# Patient Record
Sex: MALE | Race: WHITE | NOT HISPANIC OR LATINO | Employment: FULL TIME | URBAN - NONMETROPOLITAN AREA
[De-identification: names, ages, dates, MRNs, and addresses within clinical notes are randomized per-mention and may not be internally consistent; named-entity substitution may affect disease eponyms.]

---

## 2023-03-31 LAB
ANION GAP IN SER/PLAS: 15 MMOL/L (ref 10–20)
CALCIUM (MG/DL) IN SER/PLAS: 9.8 MG/DL (ref 8.6–10.6)
CARBON DIOXIDE, TOTAL (MMOL/L) IN SER/PLAS: 23 MMOL/L (ref 21–32)
CHLORIDE (MMOL/L) IN SER/PLAS: 106 MMOL/L (ref 98–107)
CREATININE (MG/DL) IN SER/PLAS: 1.27 MG/DL (ref 0.5–1.3)
ESTIMATED AVERAGE GLUCOSE FOR HBA1C: 154 MG/DL
GFR MALE: 65 ML/MIN/1.73M2
GLUCOSE (MG/DL) IN SER/PLAS: 115 MG/DL (ref 74–99)
HEMOGLOBIN A1C/HEMOGLOBIN TOTAL IN BLOOD: 7 %
POTASSIUM (MMOL/L) IN SER/PLAS: 4.2 MMOL/L (ref 3.5–5.3)
SODIUM (MMOL/L) IN SER/PLAS: 140 MMOL/L (ref 136–145)
UREA NITROGEN (MG/DL) IN SER/PLAS: 19 MG/DL (ref 6–23)

## 2023-04-21 LAB
ALANINE AMINOTRANSFERASE (SGPT) (U/L) IN SER/PLAS: 22 U/L (ref 10–52)
ALBUMIN (G/DL) IN SER/PLAS: 4.6 G/DL (ref 3.4–5)
ALBUMIN (MG/L) IN URINE: 7.7 MG/L
ALBUMIN/CREATININE (UG/MG) IN URINE: 5.1 UG/MG CRT (ref 0–30)
ALKALINE PHOSPHATASE (U/L) IN SER/PLAS: 55 U/L (ref 33–120)
ANION GAP IN SER/PLAS: 13 MMOL/L (ref 10–20)
ASPARTATE AMINOTRANSFERASE (SGOT) (U/L) IN SER/PLAS: 21 U/L (ref 9–39)
BILIRUBIN TOTAL (MG/DL) IN SER/PLAS: 0.5 MG/DL (ref 0–1.2)
CALCIUM (MG/DL) IN SER/PLAS: 10.6 MG/DL (ref 8.6–10.6)
CARBON DIOXIDE, TOTAL (MMOL/L) IN SER/PLAS: 25 MMOL/L (ref 21–32)
CHLORIDE (MMOL/L) IN SER/PLAS: 106 MMOL/L (ref 98–107)
CHOLESTEROL (MG/DL) IN SER/PLAS: 164 MG/DL (ref 0–199)
CHOLESTEROL IN HDL (MG/DL) IN SER/PLAS: 39.1 MG/DL
CHOLESTEROL/HDL RATIO: 4.2
CREATININE (MG/DL) IN SER/PLAS: 1.36 MG/DL (ref 0.5–1.3)
CREATININE (MG/DL) IN URINE: 152 MG/DL (ref 20–370)
ESTIMATED AVERAGE GLUCOSE FOR HBA1C: 151 MG/DL
GFR MALE: 60 ML/MIN/1.73M2
GLUCOSE (MG/DL) IN SER/PLAS: 117 MG/DL (ref 74–99)
HEMOGLOBIN A1C/HEMOGLOBIN TOTAL IN BLOOD: 6.9 %
LDL: 101 MG/DL (ref 0–99)
POTASSIUM (MMOL/L) IN SER/PLAS: 4.1 MMOL/L (ref 3.5–5.3)
PROSTATE SPECIFIC ANTIGEN,SCREEN: 1.01 NG/ML (ref 0–4)
PROTEIN TOTAL: 7.4 G/DL (ref 6.4–8.2)
SODIUM (MMOL/L) IN SER/PLAS: 140 MMOL/L (ref 136–145)
THYROTROPIN (MIU/L) IN SER/PLAS BY DETECTION LIMIT <= 0.05 MIU/L: 3.79 MIU/L (ref 0.44–3.98)
TRIGLYCERIDE (MG/DL) IN SER/PLAS: 119 MG/DL (ref 0–149)
UREA NITROGEN (MG/DL) IN SER/PLAS: 18 MG/DL (ref 6–23)
VLDL: 24 MG/DL (ref 0–40)

## 2023-04-23 NOTE — RESULT ENCOUNTER NOTE
Labs/urine results are all stable, reassuring.  A1c is 6.9.  Keep follow up here and with endocrinology per usual

## 2023-06-01 PROBLEM — E78.1 HYPERTRIGLYCERIDEMIA: Status: ACTIVE | Noted: 2023-06-01

## 2023-06-01 PROBLEM — R56.9 SEIZURE (MULTI): Status: ACTIVE | Noted: 2023-06-01

## 2023-06-01 PROBLEM — E16.2 HYPOGLYCEMIA: Status: ACTIVE | Noted: 2023-06-01

## 2023-06-01 PROBLEM — N52.1: Status: ACTIVE | Noted: 2023-06-01

## 2023-06-01 PROBLEM — N52.9 ERECTILE DYSFUNCTION: Status: ACTIVE | Noted: 2023-06-01

## 2023-06-01 PROBLEM — K42.9 UMBILICAL HERNIA: Status: ACTIVE | Noted: 2023-06-01

## 2023-06-01 PROBLEM — M62.08 RECTUS DIASTASIS: Status: ACTIVE | Noted: 2023-06-01

## 2023-06-01 PROBLEM — R94.31 ABNORMAL EKG: Status: ACTIVE | Noted: 2023-06-01

## 2023-06-01 PROBLEM — E78.5 HYPERLIPIDEMIA: Status: ACTIVE | Noted: 2023-06-01

## 2023-06-01 PROBLEM — I10 HYPERTENSION: Status: ACTIVE | Noted: 2023-06-01

## 2023-06-01 PROBLEM — E55.9 MILD VITAMIN D DEFICIENCY: Status: ACTIVE | Noted: 2023-06-01

## 2023-06-01 PROBLEM — R60.0 EDEMA OF BOTH LOWER EXTREMITIES: Status: ACTIVE | Noted: 2023-06-01

## 2023-06-01 PROBLEM — R07.89 CHEST PAIN, ATYPICAL: Status: ACTIVE | Noted: 2023-06-01

## 2023-06-01 PROBLEM — E10.69: Status: ACTIVE | Noted: 2023-06-01

## 2023-06-01 PROBLEM — R79.89 ELEVATED SERUM CREATININE: Status: ACTIVE | Noted: 2023-06-01

## 2023-06-01 PROBLEM — M79.643 PAIN, HAND: Status: ACTIVE | Noted: 2023-06-01

## 2023-06-01 RX ORDER — INSULIN ASPART 100 [IU]/ML
INJECTION, SOLUTION INTRAVENOUS; SUBCUTANEOUS
COMMUNITY
Start: 2014-01-08

## 2023-06-01 RX ORDER — TADALAFIL 20 MG/1
TABLET ORAL
COMMUNITY
Start: 2019-01-07 | End: 2023-09-08

## 2023-06-01 RX ORDER — ASPIRIN 81 MG/1
TABLET ORAL
COMMUNITY

## 2023-06-01 RX ORDER — TRIAMCINOLONE ACETONIDE 1 MG/G
CREAM TOPICAL
COMMUNITY
Start: 2020-08-14 | End: 2023-12-07 | Stop reason: WASHOUT

## 2023-06-01 RX ORDER — ALBUTEROL SULFATE 90 UG/1
AEROSOL, METERED RESPIRATORY (INHALATION)
COMMUNITY
Start: 2018-03-14

## 2023-06-01 RX ORDER — CARVEDILOL PHOSPHATE 10 MG/1
CAPSULE, EXTENDED RELEASE ORAL
COMMUNITY
Start: 2022-05-18 | End: 2023-08-19

## 2023-06-01 RX ORDER — ACETAMINOPHEN 500 MG
TABLET ORAL
COMMUNITY

## 2023-06-01 RX ORDER — HYDROCHLOROTHIAZIDE 12.5 MG/1
1 TABLET ORAL DAILY
COMMUNITY
Start: 2022-03-25 | End: 2023-12-14

## 2023-06-01 RX ORDER — SIMVASTATIN 40 MG/1
TABLET, FILM COATED ORAL
COMMUNITY
Start: 2014-09-04 | End: 2023-11-21

## 2023-06-01 RX ORDER — LISINOPRIL 40 MG/1
40 TABLET ORAL
COMMUNITY
End: 2023-12-14

## 2023-06-01 RX ORDER — FENOFIBRATE 145 MG/1
1 TABLET, FILM COATED ORAL DAILY
COMMUNITY
Start: 2021-08-20 | End: 2023-08-07

## 2023-06-01 RX ORDER — BLOOD SUGAR DIAGNOSTIC
STRIP MISCELLANEOUS
COMMUNITY
Start: 2014-03-28

## 2023-06-02 ENCOUNTER — OFFICE VISIT (OUTPATIENT)
Dept: PRIMARY CARE | Facility: CLINIC | Age: 58
End: 2023-06-02
Payer: COMMERCIAL

## 2023-06-02 VITALS
SYSTOLIC BLOOD PRESSURE: 106 MMHG | WEIGHT: 261.1 LBS | TEMPERATURE: 98.3 F | DIASTOLIC BLOOD PRESSURE: 64 MMHG | BODY MASS INDEX: 35.91 KG/M2 | RESPIRATION RATE: 14 BRPM | OXYGEN SATURATION: 93 % | HEART RATE: 76 BPM

## 2023-06-02 DIAGNOSIS — N52.1: ICD-10-CM

## 2023-06-02 DIAGNOSIS — Z23 IMMUNIZATION DUE: Primary | ICD-10-CM

## 2023-06-02 DIAGNOSIS — E10.69: ICD-10-CM

## 2023-06-02 DIAGNOSIS — I10 PRIMARY HYPERTENSION: ICD-10-CM

## 2023-06-02 DIAGNOSIS — E11.3293: ICD-10-CM

## 2023-06-02 PROBLEM — E66.812 OBESITY, CLASS II, BMI 35-39.9: Status: ACTIVE | Noted: 2021-03-08

## 2023-06-02 PROBLEM — F90.0 ATTENTION DEFICIT HYPERACTIVITY DISORDER, PREDOMINANTLY INATTENTIVE TYPE: Status: ACTIVE | Noted: 2019-04-03

## 2023-06-02 PROBLEM — E66.9 OBESITY, CLASS II, BMI 35-39.9: Status: ACTIVE | Noted: 2021-03-08

## 2023-06-02 PROCEDURE — 90714 TD VACC NO PRESV 7 YRS+ IM: CPT | Performed by: FAMILY MEDICINE

## 2023-06-02 PROCEDURE — 99214 OFFICE O/P EST MOD 30 MIN: CPT | Performed by: FAMILY MEDICINE

## 2023-06-02 PROCEDURE — 3078F DIAST BP <80 MM HG: CPT | Performed by: FAMILY MEDICINE

## 2023-06-02 PROCEDURE — 3044F HG A1C LEVEL LT 7.0%: CPT | Performed by: FAMILY MEDICINE

## 2023-06-02 PROCEDURE — 90471 IMMUNIZATION ADMIN: CPT | Performed by: FAMILY MEDICINE

## 2023-06-02 PROCEDURE — 4010F ACE/ARB THERAPY RXD/TAKEN: CPT | Performed by: FAMILY MEDICINE

## 2023-06-02 PROCEDURE — 1036F TOBACCO NON-USER: CPT | Performed by: FAMILY MEDICINE

## 2023-06-02 PROCEDURE — 3074F SYST BP LT 130 MM HG: CPT | Performed by: FAMILY MEDICINE

## 2023-06-02 ASSESSMENT — PATIENT HEALTH QUESTIONNAIRE - PHQ9
1. LITTLE INTEREST OR PLEASURE IN DOING THINGS: NOT AT ALL
2. FEELING DOWN, DEPRESSED OR HOPELESS: NOT AT ALL
SUM OF ALL RESPONSES TO PHQ9 QUESTIONS 1 AND 2: 0

## 2023-06-02 ASSESSMENT — PAIN SCALES - GENERAL: PAINLEVEL: 0-NO PAIN

## 2023-06-02 NOTE — PROGRESS NOTES
Subjective   Patient ID: Thompson Dhillon is a 57 y.o. male who presents for Hypertension, Hyperlipidemia, and Diabetes.    HPI   Thompson was seen today for routine follow-up of his hypertension, hyperlipidemia.  Diabetes is managed by endocrinology.  Medication(s) are being taken and tolerated as prescribed, without concerns, list reconciled today.  There are no complaints of chest pain, shortness of breath, lower extremity edema, or exertional concerns.    His last A1c was in the upper 6 range.  He denies frequent hypoglycemia, has a continuous glucose monitor, insulin pump.  Lipids have been stable for years on simvastatin.  PSA/urology care is up-to-date.  Colonoscopy up-to-date as well, has a family history of colon cancer.    Review of Systems  The full, 10+ multi-organ review of systems, is within normal limits with the exception of what is noted above in HPI.  Objective   /64 (BP Location: Right arm, Patient Position: Sitting, BP Cuff Size: Large adult)   Pulse 76   Temp 36.8 °C (98.3 °F) (Temporal)   Resp 14   Wt 118 kg (261 lb 1.6 oz)   SpO2 93%   BMI 35.91 kg/m²     Physical Exam  Cardiac exam reveals a regular rate and rhythm, no murmurs, rubs or gallops present.   Lungs are clear bilaterally.    No lower extremity edema present.  Constitutional/General appearance: alert, oriented, well-appearing, in no distress  Head and face exam is normal  No scleral icterus or conjunctival erythema present  Hearing is grossly normal  Respiratory effort is normal, no dyspnea noted  Cortical function is normal  Mood, affect, are pleasant, appropriate, and interactive.  Insight is normal    Assessment/Plan     Hypertension--- since today's blood pressures are at goal, I have recommended continuing the current treatment regimen, including medication as noted above, as well as a low salt, low-fat, high-fiber diet.  Exercise is to be continued as able and tolerated.  We will continue to follow the high blood  pressure on an every six-month basis, and address additional needs should they arise.    Hyperlipidemia--- since lipid panels are/have been stable, I have recommended continuing the current regimen.  This includes a low fat/high-fiber diet, to include foods rich in natural Omega-3's, such as seafood, nuts, and olives, so long as allergies do not prohibit.  Exercise should be continued as able.  Refills were sent as needed.  We will continue followup on an every six-month basis, and will address further needs/issues should they arise.    Tetanus vaccine booster.    Follow-up in 1 year

## 2023-08-06 DIAGNOSIS — E78.1 PURE HYPERGLYCERIDEMIA: ICD-10-CM

## 2023-08-07 RX ORDER — FENOFIBRATE 145 MG/1
145 TABLET, FILM COATED ORAL DAILY
Qty: 90 TABLET | Refills: 3 | Status: SHIPPED | OUTPATIENT
Start: 2023-08-07 | End: 2023-11-24 | Stop reason: ALTCHOICE

## 2023-08-19 DIAGNOSIS — I10 ESSENTIAL (PRIMARY) HYPERTENSION: ICD-10-CM

## 2023-08-19 RX ORDER — CARVEDILOL PHOSPHATE 10 MG/1
10 CAPSULE, EXTENDED RELEASE ORAL
Qty: 90 CAPSULE | Refills: 3 | Status: SHIPPED | OUTPATIENT
Start: 2023-08-19 | End: 2023-11-24 | Stop reason: SINTOL

## 2023-09-07 DIAGNOSIS — E10.69 TYPE 1 DIABETES MELLITUS WITH OTHER SPECIFIED COMPLICATION (MULTI): ICD-10-CM

## 2023-09-07 DIAGNOSIS — N52.1 ERECTILE DYSFUNCTION DUE TO DISEASES CLASSIFIED ELSEWHERE: ICD-10-CM

## 2023-09-08 RX ORDER — TADALAFIL 20 MG/1
TABLET ORAL
Qty: 12 TABLET | Refills: 11 | Status: SHIPPED | OUTPATIENT
Start: 2023-09-08

## 2023-11-21 DIAGNOSIS — E78.5 HYPERLIPIDEMIA, UNSPECIFIED: ICD-10-CM

## 2023-11-21 RX ORDER — SIMVASTATIN 40 MG/1
40 TABLET, FILM COATED ORAL EVERY EVENING
Qty: 90 TABLET | Refills: 3 | Status: SHIPPED | OUTPATIENT
Start: 2023-11-21

## 2023-11-22 ENCOUNTER — TELEPHONE (OUTPATIENT)
Dept: PRIMARY CARE | Facility: CLINIC | Age: 58
End: 2023-11-22

## 2023-11-22 NOTE — TELEPHONE ENCOUNTER
Pt is calling to find out if he still needs to be taking his Fenofibrate? At his work they were reviewing his medications. They wanted to know if it's necessary that he still be taking this medication     Pt also stated he was having issues with joint pain mostly in his lower leg area. Pt held his Carvedilol and noticed he did not have this issues. Pt wants to know if he can try another medication that doesn't have this type of side effect.

## 2023-11-24 DIAGNOSIS — R94.31 ABNORMAL EKG: Primary | ICD-10-CM

## 2023-11-24 RX ORDER — ATENOLOL 25 MG/1
25 TABLET ORAL DAILY
Qty: 90 TABLET | Refills: 1 | Status: SHIPPED | OUTPATIENT
Start: 2023-11-24 | End: 2024-05-22

## 2023-11-24 NOTE — TELEPHONE ENCOUNTER
Triglycerides have been excellent on fenofibrate, though there is no harm in trying off of it.  Please stop it, can see what his levels are at next routine lab check.    Discontinue carvedilol, try atenolol 25 mg daily, can a day or at bedtime.  Prescription sent to local [facility]

## 2023-12-07 ENCOUNTER — OFFICE VISIT (OUTPATIENT)
Dept: PRIMARY CARE | Facility: CLINIC | Age: 58
End: 2023-12-07
Payer: COMMERCIAL

## 2023-12-07 VITALS
WEIGHT: 268.1 LBS | TEMPERATURE: 97.6 F | HEART RATE: 75 BPM | OXYGEN SATURATION: 94 % | BODY MASS INDEX: 36.87 KG/M2 | DIASTOLIC BLOOD PRESSURE: 67 MMHG | SYSTOLIC BLOOD PRESSURE: 121 MMHG

## 2023-12-07 DIAGNOSIS — L03.011 PARONYCHIA OF FINGER OF RIGHT HAND: Primary | ICD-10-CM

## 2023-12-07 PROCEDURE — 3074F SYST BP LT 130 MM HG: CPT | Performed by: FAMILY MEDICINE

## 2023-12-07 PROCEDURE — 1036F TOBACCO NON-USER: CPT | Performed by: FAMILY MEDICINE

## 2023-12-07 PROCEDURE — 4010F ACE/ARB THERAPY RXD/TAKEN: CPT | Performed by: FAMILY MEDICINE

## 2023-12-07 PROCEDURE — 99214 OFFICE O/P EST MOD 30 MIN: CPT | Performed by: FAMILY MEDICINE

## 2023-12-07 PROCEDURE — 3078F DIAST BP <80 MM HG: CPT | Performed by: FAMILY MEDICINE

## 2023-12-07 PROCEDURE — 3044F HG A1C LEVEL LT 7.0%: CPT | Performed by: FAMILY MEDICINE

## 2023-12-07 RX ORDER — LANCETS 33 GAUGE
EACH MISCELLANEOUS
COMMUNITY
Start: 2023-11-22

## 2023-12-07 RX ORDER — LANCETS 30 GAUGE
EACH MISCELLANEOUS
COMMUNITY
Start: 2023-11-22

## 2023-12-07 RX ORDER — DOXYCYCLINE 100 MG/1
100 CAPSULE ORAL 2 TIMES DAILY
Qty: 20 CAPSULE | Refills: 0 | Status: SHIPPED | OUTPATIENT
Start: 2023-12-07 | End: 2023-12-17

## 2023-12-07 RX ORDER — GLUCAGON 1 MG
VIAL (EA) INJECTION
COMMUNITY
Start: 2023-09-22

## 2023-12-07 NOTE — PROGRESS NOTES
Subjective   Patient ID: Thompson Dhillon is a 58 y.o. male who presents for Hang Nail (Pt states that he has an infected hang nail. Has had it covered with Band-Aid and using antibiotic ointment on it. No pus coming out of the area. Redness to the area. ).  HPI  2 days of pain , swelling,  right index finger.  Noknown trauma, but nails trimmed short and believes he had a hang nail   No bleeding.    Soaking in hot water     Sugars stable  No fevers.   Normal rom of finger. No redness to hand/ arm    Review of Systems    Objective   /67 (BP Location: Right arm, Patient Position: Sitting, BP Cuff Size: Large adult)   Pulse 75   Temp 36.4 °C (97.6 °F) (Temporal)   Wt 122 kg (268 lb 1.6 oz)   SpO2 94%   BMI 36.87 kg/m²     Physical Exam    Swelling, tenderness,  mild redness, medial aspect of right index finger.  Mild redness of cuticle Nails are all cut short     Assessment/Plan   Problem List Items Addressed This Visit    None  Visit Diagnoses       Paronychia of finger of right hand    -  Primary    Relevant Medications    doxycycline (Vibramycin) 100 mg capsule            Warm water soaks   Oral antibx.   Allow nails to grow out some  Followup if not improving.       TRICIA Wilks MD

## 2023-12-14 DIAGNOSIS — I10 ESSENTIAL (PRIMARY) HYPERTENSION: ICD-10-CM

## 2023-12-14 RX ORDER — HYDROCHLOROTHIAZIDE 12.5 MG/1
12.5 TABLET ORAL DAILY
Qty: 90 TABLET | Refills: 3 | Status: SHIPPED | OUTPATIENT
Start: 2023-12-14

## 2023-12-14 RX ORDER — LISINOPRIL 40 MG/1
40 TABLET ORAL DAILY
Qty: 90 TABLET | Refills: 3 | Status: SHIPPED | OUTPATIENT
Start: 2023-12-14

## 2024-04-18 ENCOUNTER — LAB (OUTPATIENT)
Dept: LAB | Facility: LAB | Age: 59
End: 2024-04-18
Payer: COMMERCIAL

## 2024-04-18 DIAGNOSIS — E10.42 TYPE 1 DIABETES MELLITUS WITH DIABETIC POLYNEUROPATHY (MULTI): Primary | ICD-10-CM

## 2024-04-18 LAB
25(OH)D3 SERPL-MCNC: 34 NG/ML (ref 30–100)
ALBUMIN SERPL BCP-MCNC: 3.9 G/DL (ref 3.4–5)
ALP SERPL-CCNC: 81 U/L (ref 33–136)
ALT SERPL W P-5'-P-CCNC: 24 U/L (ref 10–52)
ANION GAP SERPL CALC-SCNC: 11 MMOL/L (ref 10–20)
AST SERPL W P-5'-P-CCNC: 18 U/L (ref 9–39)
BILIRUB SERPL-MCNC: 0.6 MG/DL (ref 0–1.2)
BUN SERPL-MCNC: 15 MG/DL (ref 6–23)
CALCIUM SERPL-MCNC: 9.2 MG/DL (ref 8.6–10.6)
CHLORIDE SERPL-SCNC: 105 MMOL/L (ref 98–107)
CHOLEST SERPL-MCNC: 161 MG/DL (ref 0–199)
CHOLESTEROL/HDL RATIO: 3.7
CO2 SERPL-SCNC: 27 MMOL/L (ref 21–32)
CREAT SERPL-MCNC: 0.96 MG/DL (ref 0.5–1.3)
CREAT UR-MCNC: 178.6 MG/DL (ref 20–370)
EGFRCR SERPLBLD CKD-EPI 2021: 90 ML/MIN/1.73M*2
GLUCOSE SERPL-MCNC: 201 MG/DL (ref 74–99)
HDLC SERPL-MCNC: 43.9 MG/DL
LDLC SERPL CALC-MCNC: 91 MG/DL
MICROALBUMIN UR-MCNC: 9.9 MG/L
MICROALBUMIN/CREAT UR: 5.5 UG/MG CREAT
NON HDL CHOLESTEROL: 117 MG/DL (ref 0–149)
POTASSIUM SERPL-SCNC: 4.1 MMOL/L (ref 3.5–5.3)
PROT SERPL-MCNC: 6.5 G/DL (ref 6.4–8.2)
SODIUM SERPL-SCNC: 139 MMOL/L (ref 136–145)
TRIGL SERPL-MCNC: 129 MG/DL (ref 0–149)
TSH SERPL-ACNC: 1.85 MIU/L (ref 0.44–3.98)
VLDL: 26 MG/DL (ref 0–40)

## 2024-04-18 PROCEDURE — 84443 ASSAY THYROID STIM HORMONE: CPT

## 2024-04-18 PROCEDURE — 80053 COMPREHEN METABOLIC PANEL: CPT

## 2024-04-18 PROCEDURE — 36415 COLL VENOUS BLD VENIPUNCTURE: CPT

## 2024-04-18 PROCEDURE — 82043 UR ALBUMIN QUANTITATIVE: CPT

## 2024-04-18 PROCEDURE — 82570 ASSAY OF URINE CREATININE: CPT

## 2024-04-18 PROCEDURE — 80061 LIPID PANEL: CPT

## 2024-04-18 PROCEDURE — 82306 VITAMIN D 25 HYDROXY: CPT

## 2024-06-04 ENCOUNTER — LAB (OUTPATIENT)
Dept: LAB | Facility: LAB | Age: 59
End: 2024-06-04
Payer: COMMERCIAL

## 2024-06-04 ENCOUNTER — OFFICE VISIT (OUTPATIENT)
Dept: PRIMARY CARE | Facility: CLINIC | Age: 59
End: 2024-06-04
Payer: COMMERCIAL

## 2024-06-04 DIAGNOSIS — Z12.5 SCREENING PSA (PROSTATE SPECIFIC ANTIGEN): ICD-10-CM

## 2024-06-04 DIAGNOSIS — I10 PRIMARY HYPERTENSION: ICD-10-CM

## 2024-06-04 DIAGNOSIS — Z23 IMMUNIZATION DUE: ICD-10-CM

## 2024-06-04 DIAGNOSIS — M25.50 ARTHRALGIA, UNSPECIFIED JOINT: ICD-10-CM

## 2024-06-04 DIAGNOSIS — N52.1: ICD-10-CM

## 2024-06-04 DIAGNOSIS — M79.10 MYALGIA: ICD-10-CM

## 2024-06-04 DIAGNOSIS — M79.10 MYALGIA: Primary | ICD-10-CM

## 2024-06-04 DIAGNOSIS — E10.69: ICD-10-CM

## 2024-06-04 DIAGNOSIS — E11.3293: ICD-10-CM

## 2024-06-04 PROCEDURE — 36415 COLL VENOUS BLD VENIPUNCTURE: CPT

## 2024-06-04 PROCEDURE — 84153 ASSAY OF PSA TOTAL: CPT

## 2024-06-04 PROCEDURE — 3048F LDL-C <100 MG/DL: CPT | Performed by: FAMILY MEDICINE

## 2024-06-04 PROCEDURE — 82550 ASSAY OF CK (CPK): CPT

## 2024-06-04 PROCEDURE — 85652 RBC SED RATE AUTOMATED: CPT

## 2024-06-04 PROCEDURE — 3075F SYST BP GE 130 - 139MM HG: CPT | Performed by: FAMILY MEDICINE

## 2024-06-04 PROCEDURE — 4010F ACE/ARB THERAPY RXD/TAKEN: CPT | Performed by: FAMILY MEDICINE

## 2024-06-04 PROCEDURE — 99213 OFFICE O/P EST LOW 20 MIN: CPT | Performed by: FAMILY MEDICINE

## 2024-06-04 PROCEDURE — 86235 NUCLEAR ANTIGEN ANTIBODY: CPT

## 2024-06-04 PROCEDURE — 3078F DIAST BP <80 MM HG: CPT | Performed by: FAMILY MEDICINE

## 2024-06-04 PROCEDURE — 86431 RHEUMATOID FACTOR QUANT: CPT

## 2024-06-04 PROCEDURE — G0439 PPPS, SUBSEQ VISIT: HCPCS | Performed by: FAMILY MEDICINE

## 2024-06-04 PROCEDURE — 3061F NEG MICROALBUMINURIA REV: CPT | Performed by: FAMILY MEDICINE

## 2024-06-04 PROCEDURE — 86225 DNA ANTIBODY NATIVE: CPT

## 2024-06-04 PROCEDURE — 86038 ANTINUCLEAR ANTIBODIES: CPT

## 2024-06-04 PROCEDURE — 86140 C-REACTIVE PROTEIN: CPT

## 2024-06-04 NOTE — PROGRESS NOTES
Subjective   Patient ID: Thompson Dhillon is a 58 y.o. male who presents for Annual Exam (Pt is here for his Annual Exam. Pt has some concerns to discuss today.).    HPI   Thompson was seen today for a routine follow-up of his medications, as well as an annual wellness review.  Medication(s) are being taken and tolerated as prescribed, without concerns, list reconciled today.  There are no complaints of chest pain, shortness of breath, lower extremity edema, or exertional concerns  Endocrinology continues to manage his diabetes/insulin pump.  Since his last visit he tried eliminating simvastatin to reduce muscle/joint pains, does not think it helped much.  Last A1c was 6.7 on 3-21-24 per endocrine.    Wellness review:  Colonoscopy is up-to-date  He does not need a AAA screening ultrasound  Flu, both pneumonia, both shingles vaccines are up-to-date.  Tetanus is as well.  He is due for PSA  Review of Systems  The full, 10+ multi-organ review of systems, is within normal limits with the exception of what is noted above in HPI.  Objective   /73 (BP Location: Right arm, Patient Position: Sitting, BP Cuff Size: Large adult)   Pulse 72   Temp 36.8 °C (98.3 °F) (Temporal)   SpO2 93%     Physical Exam  Cardiac exam reveals a regular rate and rhythm, no murmurs, rubs or gallops present.   Lungs are clear bilaterally.    No lower extremity edema present.  Constitutional/General appearance: alert, oriented, well-appearing, in no distress  Head and face exam is normal  No scleral icterus or conjunctival erythema present  Hearing is grossly normal  Respiratory effort is normal, no dyspnea noted  Cortical function is normal  Mood, affect, are pleasant, appropriate, and interactive.  Insight is normal    Assessment/Plan     Hyperlipidemia and hypertension are stable  Diabetes managed by endocrine, last A1c excellent.  Wellness checklist as noted in HPI  Labs as ordered today, including rheumatology workup.    Follow-up in 6  months  **Portions of this medical record have been created using voice recognition software and may have minor errors which are inherent in voice recognition systems. It has not been fully edited for typographical or grammatical errors**

## 2024-06-05 LAB
CK SERPL-CCNC: 176 U/L (ref 0–325)
CRP SERPL-MCNC: 0.88 MG/DL
ERYTHROCYTE [SEDIMENTATION RATE] IN BLOOD BY WESTERGREN METHOD: 11 MM/H (ref 0–20)
PSA SERPL-MCNC: 0.93 NG/ML
RHEUMATOID FACT SER NEPH-ACNC: 10 IU/ML (ref 0–15)

## 2024-06-06 LAB
ANA PATTERN: ABNORMAL
ANA SER QL HEP2 SUBST: POSITIVE
ANA TITR SER IF: ABNORMAL {TITER}
CENTROMERE B AB SER-ACNC: <0.2 AI
CHROMATIN AB SERPL-ACNC: <0.2 AI
DSDNA AB SER-ACNC: 6 IU/ML
ENA JO1 AB SER QL IA: <0.2 AI
ENA RNP AB SER IA-ACNC: <0.2 AI
ENA SCL70 AB SER QL IA: <0.2 AI
ENA SM AB SER IA-ACNC: <0.2 AI
ENA SM+RNP AB SER QL IA: <0.2 AI
ENA SS-A AB SER IA-ACNC: <0.2 AI
ENA SS-B AB SER IA-ACNC: <0.2 AI
RIBOSOMAL P AB SER-ACNC: <0.2 AI

## 2024-06-10 NOTE — RESULT ENCOUNTER NOTE
PSA is normal  Rheumatology/arthritis labs show normal inflammation levels, but the presence of what's called an BART antibody.  Can mean the presence of a number of different arthritic conditions, like lupus, but not always.  To further evaluate, recommend a rheumatology referral.  I'll place an order.

## 2024-06-11 ENCOUNTER — TELEPHONE (OUTPATIENT)
Dept: PRIMARY CARE | Facility: CLINIC | Age: 59
End: 2024-06-11
Payer: COMMERCIAL

## 2024-06-11 VITALS
OXYGEN SATURATION: 93 % | HEART RATE: 72 BPM | TEMPERATURE: 98.3 F | SYSTOLIC BLOOD PRESSURE: 134 MMHG | DIASTOLIC BLOOD PRESSURE: 73 MMHG

## 2024-06-20 DIAGNOSIS — E78.5 HYPERLIPIDEMIA, UNSPECIFIED HYPERLIPIDEMIA TYPE: Primary | ICD-10-CM

## 2024-06-20 RX ORDER — ROSUVASTATIN CALCIUM 10 MG/1
10 TABLET, COATED ORAL DAILY
Qty: 90 TABLET | Refills: 3 | Status: SHIPPED | OUTPATIENT
Start: 2024-06-20 | End: 2025-07-25

## 2024-08-27 ENCOUNTER — APPOINTMENT (OUTPATIENT)
Dept: RHEUMATOLOGY | Facility: CLINIC | Age: 59
End: 2024-08-27
Payer: COMMERCIAL

## 2024-08-27 VITALS
TEMPERATURE: 97.1 F | HEIGHT: 71 IN | DIASTOLIC BLOOD PRESSURE: 71 MMHG | SYSTOLIC BLOOD PRESSURE: 133 MMHG | WEIGHT: 255 LBS | HEART RATE: 73 BPM | BODY MASS INDEX: 35.7 KG/M2

## 2024-08-27 DIAGNOSIS — E10.9 TYPE 1 DIABETES MELLITUS WITHOUT COMPLICATION (MULTI): ICD-10-CM

## 2024-08-27 DIAGNOSIS — M25.50 ARTHRALGIA, UNSPECIFIED JOINT: Primary | ICD-10-CM

## 2024-08-27 PROCEDURE — 3075F SYST BP GE 130 - 139MM HG: CPT | Performed by: INTERNAL MEDICINE

## 2024-08-27 PROCEDURE — 4010F ACE/ARB THERAPY RXD/TAKEN: CPT | Performed by: INTERNAL MEDICINE

## 2024-08-27 PROCEDURE — 1036F TOBACCO NON-USER: CPT | Performed by: INTERNAL MEDICINE

## 2024-08-27 PROCEDURE — 3078F DIAST BP <80 MM HG: CPT | Performed by: INTERNAL MEDICINE

## 2024-08-27 PROCEDURE — 3008F BODY MASS INDEX DOCD: CPT | Performed by: INTERNAL MEDICINE

## 2024-08-27 PROCEDURE — 3061F NEG MICROALBUMINURIA REV: CPT | Performed by: INTERNAL MEDICINE

## 2024-08-27 PROCEDURE — 3048F LDL-C <100 MG/DL: CPT | Performed by: INTERNAL MEDICINE

## 2024-08-27 PROCEDURE — 99204 OFFICE O/P NEW MOD 45 MIN: CPT | Performed by: INTERNAL MEDICINE

## 2024-08-27 RX ORDER — NAPROXEN SODIUM 220 MG/1
1200 TABLET ORAL DAILY
COMMUNITY

## 2024-08-27 RX ORDER — ACETAMINOPHEN, DIPHENHYDRAMINE HCL, PHENYLEPHRINE HCL 325; 25; 5 MG/1; MG/1; MG/1
5000 TABLET ORAL DAILY
COMMUNITY

## 2024-08-27 ASSESSMENT — PAIN SCALES - GENERAL: PAINLEVEL: 0-NO PAIN

## 2024-08-27 NOTE — PROGRESS NOTES
Subjective   Patient ID: Thompson Dhillon is a 59 y.o. male who presents for New Patient Visit (New patient. Sent for lab results by primary doctor.).    HPI  58 yo male with diabetes and HTN  His BART came positive  He reports intermittent, migratory joint pain  He denies hand swelling, AM stiffness, skin rashes  He denies dry eyes or dry mouth  No Raynaud, no weakness  No thyroid disease  Family h/o her cousin lupus  He denies smoking, alcohol  ROS  Joint pain in hands: negative   Joint swelling: negative  Morning stiffness and duration: negative   strength: normal  Oral ulcer: negative  Genital ulcer: negative  Raynaud phenomenon: negative  Chest pain/dyspnea: negative  Low back pain: negative  Visual problem: negative  Dry eyes/dry mouth: negative  Skin rash/scaling/psoriasis: negative       Objective     PEXAM  VS reviewed, WNL  General: Alert, no distress   HEENT: Normocephalic/atraumatic, No alopecia. PERRLA. Sclera white, conjunctiva pink, no malar rash. no oral or nasal ulcer. Oral cavity pink and moist, no erythema or exudate, dentition good.   Neck: supple  Respiratory: CTA B, no adventitious breath sounds  Cardiac: RRR, no murmurs, carotid, or bruits  Abdominal: symmetrical, soft, non-tender, non-distended, normoactive BSx4 quadrants, no CVA tenderness or suprapubic tenderness  MSK: Joints of upper and lower extremities were assessed for synovitis and ROM.    Today she has no evidence of synovitis in the joints of her hands or wrists, tender joint count 0, swollen joint count 0   Extremities: no clubbing, no cyanosis, no edema  Skin: Skin warm and moist.   Neuro: non-focal, Strength 5/5 throughout. Normal gait. No cerebellar pathologic exam     Assessment/Plan   58 yo male with HTN and diabetes  He has migratory, intermittent joint pain  His BART came positive, but he does not have any other SLE, CTDs markers.  PExam did not reveal any synovitis or skin rashes  His NESTOR neg  I do not think he has any  inflammatory arthritis  -will call us if he has any new symptoms

## 2024-09-13 DIAGNOSIS — E10.69 TYPE 1 DIABETES MELLITUS WITH OTHER SPECIFIED COMPLICATION (MULTI): ICD-10-CM

## 2024-09-13 DIAGNOSIS — N52.1 ERECTILE DYSFUNCTION DUE TO DISEASES CLASSIFIED ELSEWHERE: ICD-10-CM

## 2024-09-14 RX ORDER — TADALAFIL 20 MG/1
20 TABLET ORAL DAILY PRN
Qty: 9 TABLET | Refills: 0 | Status: SHIPPED | OUTPATIENT
Start: 2024-09-14 | End: 2024-10-14

## 2024-10-11 DIAGNOSIS — E10.69 TYPE 1 DIABETES MELLITUS WITH OTHER SPECIFIED COMPLICATION: ICD-10-CM

## 2024-10-11 DIAGNOSIS — N52.1 ERECTILE DYSFUNCTION DUE TO DISEASES CLASSIFIED ELSEWHERE: ICD-10-CM

## 2024-10-12 RX ORDER — TADALAFIL 20 MG/1
20 TABLET ORAL DAILY PRN
Qty: 18 TABLET | Refills: 1 | Status: SHIPPED | OUTPATIENT
Start: 2024-10-12

## 2025-04-17 DIAGNOSIS — I10 ESSENTIAL (PRIMARY) HYPERTENSION: ICD-10-CM

## 2025-04-18 RX ORDER — HYDROCHLOROTHIAZIDE 12.5 MG/1
12.5 TABLET ORAL DAILY
Qty: 90 TABLET | Refills: 0 | Status: SHIPPED | OUTPATIENT
Start: 2025-04-18

## 2025-04-18 RX ORDER — LISINOPRIL 40 MG/1
40 TABLET ORAL DAILY
Qty: 90 TABLET | Refills: 0 | Status: SHIPPED | OUTPATIENT
Start: 2025-04-18

## 2025-05-27 DIAGNOSIS — E78.5 HYPERLIPIDEMIA, UNSPECIFIED HYPERLIPIDEMIA TYPE: ICD-10-CM

## 2025-05-27 DIAGNOSIS — I10 ESSENTIAL (PRIMARY) HYPERTENSION: ICD-10-CM

## 2025-05-27 RX ORDER — HYDROCHLOROTHIAZIDE 12.5 MG/1
12.5 TABLET ORAL DAILY
Qty: 90 TABLET | Refills: 0 | Status: SHIPPED | OUTPATIENT
Start: 2025-05-27

## 2025-05-27 RX ORDER — ROSUVASTATIN CALCIUM 10 MG/1
10 TABLET, COATED ORAL DAILY
Qty: 90 TABLET | Refills: 3 | Status: SHIPPED | OUTPATIENT
Start: 2025-05-27

## 2025-05-27 RX ORDER — LISINOPRIL 40 MG/1
40 TABLET ORAL DAILY
Qty: 90 TABLET | Refills: 0 | Status: SHIPPED | OUTPATIENT
Start: 2025-05-27

## 2025-06-06 ENCOUNTER — APPOINTMENT (OUTPATIENT)
Dept: PRIMARY CARE | Facility: CLINIC | Age: 60
End: 2025-06-06
Payer: COMMERCIAL

## 2025-06-06 ENCOUNTER — OFFICE VISIT (OUTPATIENT)
Dept: PRIMARY CARE | Facility: CLINIC | Age: 60
End: 2025-06-06
Payer: COMMERCIAL

## 2025-06-06 VITALS
BODY MASS INDEX: 37.24 KG/M2 | DIASTOLIC BLOOD PRESSURE: 89 MMHG | OXYGEN SATURATION: 94 % | TEMPERATURE: 97.8 F | SYSTOLIC BLOOD PRESSURE: 165 MMHG | WEIGHT: 267 LBS | HEART RATE: 65 BPM | RESPIRATION RATE: 14 BRPM

## 2025-06-06 DIAGNOSIS — E10.9 TYPE 1 DIABETES MELLITUS WITHOUT COMPLICATION: ICD-10-CM

## 2025-06-06 DIAGNOSIS — Z23 IMMUNIZATION DUE: ICD-10-CM

## 2025-06-06 DIAGNOSIS — E78.1 HYPERTRIGLYCERIDEMIA: ICD-10-CM

## 2025-06-06 DIAGNOSIS — Z00.00 WELL ADULT EXAM: Primary | ICD-10-CM

## 2025-06-06 DIAGNOSIS — R56.9 SEIZURE (MULTI): ICD-10-CM

## 2025-06-06 DIAGNOSIS — Z12.5 PROSTATE CANCER SCREENING: ICD-10-CM

## 2025-06-06 DIAGNOSIS — Z96.41 INSULIN PUMP STATUS: ICD-10-CM

## 2025-06-06 DIAGNOSIS — I25.10 CORONARY ARTERY DISEASE INVOLVING NATIVE CORONARY ARTERY OF NATIVE HEART WITHOUT ANGINA PECTORIS: ICD-10-CM

## 2025-06-06 PROCEDURE — 90677 PCV20 VACCINE IM: CPT | Performed by: STUDENT IN AN ORGANIZED HEALTH CARE EDUCATION/TRAINING PROGRAM

## 2025-06-06 PROCEDURE — 4010F ACE/ARB THERAPY RXD/TAKEN: CPT | Performed by: STUDENT IN AN ORGANIZED HEALTH CARE EDUCATION/TRAINING PROGRAM

## 2025-06-06 PROCEDURE — 3077F SYST BP >= 140 MM HG: CPT | Performed by: STUDENT IN AN ORGANIZED HEALTH CARE EDUCATION/TRAINING PROGRAM

## 2025-06-06 PROCEDURE — 99396 PREV VISIT EST AGE 40-64: CPT | Performed by: STUDENT IN AN ORGANIZED HEALTH CARE EDUCATION/TRAINING PROGRAM

## 2025-06-06 PROCEDURE — 3079F DIAST BP 80-89 MM HG: CPT | Performed by: STUDENT IN AN ORGANIZED HEALTH CARE EDUCATION/TRAINING PROGRAM

## 2025-06-06 PROCEDURE — 90471 IMMUNIZATION ADMIN: CPT | Performed by: STUDENT IN AN ORGANIZED HEALTH CARE EDUCATION/TRAINING PROGRAM

## 2025-06-06 PROCEDURE — 99214 OFFICE O/P EST MOD 30 MIN: CPT | Performed by: STUDENT IN AN ORGANIZED HEALTH CARE EDUCATION/TRAINING PROGRAM

## 2025-06-06 RX ORDER — EZETIMIBE 10 MG/1
10 TABLET ORAL DAILY
Qty: 90 TABLET | Refills: 1 | Status: SHIPPED | OUTPATIENT
Start: 2025-06-06 | End: 2025-06-12 | Stop reason: SINTOL

## 2025-06-06 ASSESSMENT — PATIENT HEALTH QUESTIONNAIRE - PHQ9
9. THOUGHTS THAT YOU WOULD BE BETTER OFF DEAD, OR OF HURTING YOURSELF: NOT AT ALL
8. MOVING OR SPEAKING SO SLOWLY THAT OTHER PEOPLE COULD HAVE NOTICED. OR THE OPPOSITE, BEING SO FIGETY OR RESTLESS THAT YOU HAVE BEEN MOVING AROUND A LOT MORE THAN USUAL: NOT AT ALL
3. TROUBLE FALLING OR STAYING ASLEEP OR SLEEPING TOO MUCH: SEVERAL DAYS
5. POOR APPETITE OR OVEREATING: SEVERAL DAYS
1. LITTLE INTEREST OR PLEASURE IN DOING THINGS: NOT AT ALL
2. FEELING DOWN, DEPRESSED OR HOPELESS: NOT AT ALL
SUM OF ALL RESPONSES TO PHQ QUESTIONS 1-9: 2
7. TROUBLE CONCENTRATING ON THINGS, SUCH AS READING THE NEWSPAPER OR WATCHING TELEVISION: NOT AT ALL
1. LITTLE INTEREST OR PLEASURE IN DOING THINGS: NOT AT ALL
6. FEELING BAD ABOUT YOURSELF - OR THAT YOU ARE A FAILURE OR HAVE LET YOURSELF OR YOUR FAMILY DOWN: NOT AT ALL
SUM OF ALL RESPONSES TO PHQ9 QUESTIONS 1 AND 2: 0
2. FEELING DOWN, DEPRESSED OR HOPELESS: NOT AT ALL
SUM OF ALL RESPONSES TO PHQ9 QUESTIONS 1 AND 2: 0
4. FEELING TIRED OR HAVING LITTLE ENERGY: NOT AT ALL

## 2025-06-06 ASSESSMENT — ANXIETY QUESTIONNAIRES
2. NOT BEING ABLE TO STOP OR CONTROL WORRYING: NOT AT ALL
4. TROUBLE RELAXING: NOT AT ALL
7. FEELING AFRAID AS IF SOMETHING AWFUL MIGHT HAPPEN: NOT AT ALL
6. BECOMING EASILY ANNOYED OR IRRITABLE: NOT AT ALL
1. FEELING NERVOUS, ANXIOUS, OR ON EDGE: NOT AT ALL
3. WORRYING TOO MUCH ABOUT DIFFERENT THINGS: NOT AT ALL
5. BEING SO RESTLESS THAT IT IS HARD TO SIT STILL: NOT AT ALL
IF YOU CHECKED OFF ANY PROBLEMS ON THIS QUESTIONNAIRE, HOW DIFFICULT HAVE THESE PROBLEMS MADE IT FOR YOU TO DO YOUR WORK, TAKE CARE OF THINGS AT HOME, OR GET ALONG WITH OTHER PEOPLE: NOT DIFFICULT AT ALL
GAD7 TOTAL SCORE: 0

## 2025-06-06 NOTE — PATIENT INSTRUCTIONS
VISIT SUMMARY:  Today, we reviewed your joint discomfort, diabetes management, hypertension, and other health concerns. We discussed your recent switch to a new insulin pump and CGM system, and your experience with cholesterol and blood pressure medications. We also reviewed your history of eye issues, fatty liver disease, and skin cancer.    YOUR PLAN:  -TYPE 1 DIABETES MELLITUS: Your diabetes is well-controlled with your current insulin pump and continuous glucose monitor (CGM). You have not experienced any hypoglycemic seizures since using the CGM. Continue with your current insulin pump and CGM management.    -HYPERLIPIDEMIA: Hyperlipidemia means you have high levels of fats (lipids) in your blood. To help manage this, we are starting you on Zetia 10 mg daily as an alternative to statins. We will recheck your lipid levels in 3 months. If needed, we may consult a pharmacist to optimize your medication.    -ESSENTIAL HYPERTENSION: Hypertension is high blood pressure, which can lead to serious health issues if not controlled. We are restarting your lisinopril 40 mg daily and hydrochlorothiazide 12.5 mg daily to help manage your blood pressure.    -FATTY LIVER DISEASE: Fatty liver disease is common in diabetes and can lead to liver damage if not managed. We will discuss management strategies for this condition at your next visit.    -RETINOPATHY: Retinopathy involves damage to the retina of the eye, often due to diabetes. Continue your follow-ups with the retina specialist, and we will obtain your records from [facility].    -HISTORY OF SKIN CANCER: Given your history of skin cancer, it is important to continue regular follow-ups with your dermatology clinic.    -GENERAL HEALTH MAINTENANCE: You are up to date with most vaccinations but are due for the pneumococcal vaccine, which we will administer today. We will also order a PSA screening and continue regular colonoscopy screenings every 5 years  due to your family history of colon cancer.    INSTRUCTIONS:  Please schedule a follow-up appointment in 6 months after your endocrinology visit. We will also order labs in 3 months to assess your lipid levels after starting Zetia.

## 2025-06-06 NOTE — PROGRESS NOTES
FAMILY MEDICINE  WELL ADULT VISIT   Thompson Dhillon  [id number]  1965    PCP: Le Avila DO     Chief Complaint:   Chief Complaint   Patient presents with    Annual Exam     SUBJECTIVE     Thompson Dhillon is a 59 y.o. English-speaking male with pertinent PMHx of T1Dm, who presents to the clinic for well adult exam.     T1DM   - Control when doing   - Pump: T Slim 2  - CGM: Dexcom G7  - Just trying to figure out       DigiSynd     HPI    History of Present Illness  Thompson Dhillon is a 59 year old male with type 1 diabetes and hypertension who presents with joint discomfort and medication review.    He experiences joint discomfort, particularly in his hands, which he associates with his use of rosuvastatin. He stopped taking the medication about a week ago and notes some improvement, although he still experiences discomfort upon waking. He has a history of taking various cholesterol medications, including Lipitor.    He has type 1 diabetes and uses an insulin pump. Recently, he upgraded to a T slim pump and a Dexcom CGM, which required switching to an iPhone for compatibility. He finds the new system challenging to manage, as he is still adjusting to the new settings and determining his correction factors. He has experienced seizures in the past due to hypoglycemia, which led to shoulder surgery after a seizure-related injury. Since using the Dexcom CGM, he has not had further seizures.    He has a history of hypertension and previously stopped all medications for a week due to a busy work schedule and difficulty managing his pill container. He reports significant swelling in the past, which he attributes to amlodipine.    He has a history of eye issues, including a laser procedure on his right eye for a spot close to the retina. He follows up with a retina specialist and is due for another eye exam. He also has a history of fatty liver disease noted on a CT cardiac score in 2022.    His family history  includes colon cancer in his father, who passed away at 64, and abdominal cancer in his mother. His paternal grandfather and father both had heart attacks. His grandmother on his father's side had type 2 diabetes. He has a personal history of skin cancer, specifically squamous cell carcinoma.    He works in , often working 60-hour weeks, and has been donating blood for many years, reaching 20 gallons. 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    FamHx  - Stroke: none  - Dad: colon cancer dscd (65yo)  - Pat gpa: MI   - Dad brother: colon ca living.   - Mom: abdominal cancer, dcsd   - Denies family history of breast, ovarian, uterine, endometrial, pancreatic, prostate, or skin cancer.   - Pat gma: T2DM     Hx of Basal Cell Cancer  - [facility]   - typically once per year   - Dr brandon    Cancer Screening  - Pap Smear (21-28yo, 30-66yo, per [facility]P): NI  - Mammogram (Biennial, 40-73yo): NI  - Colonoscopy (start 44yo): 2022, q5y   - Low dose CT (50-81yo w 20pk, current or quit w/in 15yr): NI  - PSA (55-70yo M): due    CVD   - BMI: Body mass index is 37.24 kg/m².  - ASCVD: The 10-year ASCVD risk score (Lori ROBLEDO, et al., 2019) is: 27.2%    Values used to calculate the score:      Age: 59 years      Sex: Male      Is Non- : No      Diabetic: Yes      Tobacco smoker: No      Systolic Blood Pressure: 165 mmHg      Is BP treated: Yes      HDL Cholesterol: 43.9 mg/dL      Total Cholesterol: 161 mg/dL  - BP:   - DM: Last A1c   - Cholesterol: Last lipid panel     Immunizations  - Tdap (q10y): 6/2023  - COVID (4yo+): OOS  - Influenza (annual): OOS  - HPV (9-44yo): graduated  - Shingrix (>51yo): x2  - PNA (>51yo): due    Other Screening  - Depression: PHQ-9    - Osteoporosis (Dexa >66yo, q2h if abnl): NI  - AAA (M 65-74yo ever smoke): NYI  - HIV (15-66yo, once in lifetime):   - Hep C (18-78yo, once in lifetime):   -  Syphilis (people at increased risk):   - GC/CT (F sexually active <26yo, >26yo at increased risk):       - 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     Social Hx Social History[1]   Medical Hx Medical History[2]  Problem List[3]   Allergies Allergies[4]   Surgical Hx Surgical History[5]   Family Hx Family History[6]   Immunizations Immunization History   Administered Date(s) Administered    Flu vaccine (IIV4), preservative free *Check age/dose* 12/10/2015    Flu vaccine, trivalent, preservative free, age 6 months and greater (Fluarix/Fluzone/Flulaval) 09/20/2022, 12/20/2024    Influenza, seasonal, injectable 09/08/2017, 08/24/2018, 08/28/2019, 08/14/2020, 09/14/2021    Terrence SARS-CoV-2 Vaccination 03/07/2021    Moderna SARS-CoV-2 Vaccination 01/06/2022    Pneumococcal conjugate vaccine, 13-valent (PREVNAR 13) 08/26/2016    Pneumococcal conjugate vaccine, 20-valent (PREVNAR 20) 06/06/2025    Pneumococcal polysaccharide vaccine, 23-valent, age 2 years and older (PNEUMOVAX 23) 04/27/2012    Td vaccine, age 7 years and older (TENIVAC) 06/02/2023    Tdap vaccine, age 7 year and older (BOOSTRIX, ADACEL) 04/27/2012    Zoster vaccine, recombinant, adult (SHINGRIX) 03/29/2019, 06/14/2019      Medication List Current Outpatient Medications   Medication Instructions    albuterol 90 mcg/actuation inhaler Inhale. INHALE 1 TO 2 PUFFS BY MOUTH EVERY 4 TO 6 HOURS AS NEEDED    aspirin 81 mg EC tablet Take by mouth.    blood sugar diagnostic (Contour Next Test Strips) strip Ivet Contour Next Test STRP   Quantity: 255  Refills: 0        Start : 28-Mar-2014   Active    cholecalciferol (Vitamin D-3) 5,000 Units tablet Take by mouth.    cyanocobalamin (vitamin B-12) (VITAMIN B-12) 5,000 mcg, Daily    Glucagon Emergency Kit,  human, 1 mg injection INJECT (1)ONE MG FOR INSULIN SHOCK.    hydroCHLOROthiazide (MICROZIDE) 12.5 mg, oral, Daily    insulin aspart (NovoLOG) 100 unit/mL injection Inject under the skin. 150 UNITS DAILY    lisinopril 40 mg, oral, Daily, for blood pressure    omega 3-dha-epa-fish oil (Fish OiL) 1,200 (144-216) mg capsule 1,200 mg, Daily    OneTouch Delica Plus Lancet 33 gauge misc USE TO TEST BLOOD SUGAR ONCE DAILY    OneTouch Verio Reflect Meter misc TO TEST BLOOD SUGAR.    tadalafil 20 mg, oral, Daily PRN          OBJECTIVE   /89 (BP Location: Left arm, Patient Position: Sitting, BP Cuff Size: Large adult)   Pulse 65   Temp 36.6 °C (97.8 °F) (Temporal)   Resp 14   Wt 121 kg (267 lb)   SpO2 94%   BMI 37.24 kg/m²   Vital signs and pulse oximetry reviewed.     Physical Exam  Vitals and nursing note reviewed.   Constitutional:       Appearance: Normal appearance.   HENT:      Head: Normocephalic and atraumatic.      Right Ear: Tympanic membrane, ear canal and external ear normal.      Left Ear: Tympanic membrane, ear canal and external ear normal.      Nose: Nose normal. No congestion or rhinorrhea.   Eyes:      General: No scleral icterus.     Conjunctiva/sclera: Conjunctivae normal.   Cardiovascular:      Rate and Rhythm: Normal rate and regular rhythm.      Heart sounds: No murmur heard.  Pulmonary:      Effort: Pulmonary effort is normal. No respiratory distress.      Breath sounds: Normal breath sounds. No wheezing, rhonchi or rales.   Musculoskeletal:      Cervical back: No rigidity or tenderness.      Right lower leg: No edema.      Left lower leg: No edema.   Lymphadenopathy:      Cervical: No cervical adenopathy.   Skin:     General: Skin is warm.      Coloration: Skin is not jaundiced.   Neurological:      Mental Status: He is alert. Mental status is at baseline.   Psychiatric:         Mood and Affect: Mood normal. Mood is not anxious or depressed. Affect is flat. Affect is not labile or tearful.          Behavior: Behavior normal.       Physical Exam  CHEST: Lungs clear to auscultation bilaterally.  CARDIOVASCULAR: Heart sounds normal.    Results  LABS  LDL: 84 mg/dL (04/2025)  HbA1c: 6.9% (04/2025)    RADIOLOGY  CT cardiac score: Coronary artery plaque, hepatic steatosis, pulmonary nodules <5mm (2022)      ASSESSMENT & PLAN     Problem List Items Addressed This Visit       Hypertriglyceridemia    Relevant Orders    TSH with reflex to Free T4 if abnormal    Vitamin B12    Vitamin D 25-Hydroxy,Total (for eval of Vitamin D levels)    CBC and Auto Differential    Comprehensive Metabolic Panel    Lipid Panel    Prostate Specific Antigen, Screen    Insulin pump status    Seizure (Multi)    Overview   - Low blood sugars were the cause   - Only had seizures with the low blood sugars.   - No epilepsy diagnosis.   - Hx of shoulder dislocation during seizures  - Not had any since the CGM         Relevant Orders    TSH with reflex to Free T4 if abnormal    Vitamin B12    Vitamin D 25-Hydroxy,Total (for eval of Vitamin D levels)    CBC and Auto Differential    Comprehensive Metabolic Panel    Lipid Panel    Prostate Specific Antigen, Screen    Pneumococcal conjugate vaccine, 20-valent (PREVNAR 20) (Completed)    Follow Up In [facility] - PCP - Established    Follow Up In [facility] - PCP - Health Maintenance    Type 1 diabetes mellitus    Overview   Formatting of this note might be different from the original. On insulin pump therapy (Novolog insulin, Medtronic 670G with CGM; Basal rate: 00:00 = 1.5 04:00 = 3.5 06:00 = 3.0 22:00 = 1.5 units/hour Bolus: Insulin:carb ratio = 10 Sensitivity = 25 Blood glucose target 00:00 =  Insulin duration= 3 High alert not set Low alert 70  Foot numbness.  Hypoglycemic unawareness.  Mild non-proliferative diabetic retinopathy. Tests blood glucose 6x/day. Last Assessment & Plan: Formatting of this note might be different from the original. Assessment: managed  with insulin pump HgbA1c 3/30/2023 Follows up with endocrinology         Relevant Orders    TSH with reflex to Free T4 if abnormal    Vitamin B12    Vitamin D 25-Hydroxy,Total (for eval of Vitamin D levels)    CBC and Auto Differential    Comprehensive Metabolic Panel    Lipid Panel    Prostate Specific Antigen, Screen    Pneumococcal conjugate vaccine, 20-valent (PREVNAR 20) (Completed)    Follow Up In [facility] - PCP - Established    Follow Up In [facility] - PCP - Health Maintenance     Other Visit Diagnoses         Well adult exam    -  Primary    Relevant Orders    TSH with reflex to Free T4 if abnormal    Vitamin B12    Vitamin D 25-Hydroxy,Total (for eval of Vitamin D levels)    CBC and Auto Differential    Comprehensive Metabolic Panel    Lipid Panel    Prostate Specific Antigen, Screen    Pneumococcal conjugate vaccine, 20-valent (PREVNAR 20) (Completed)      Immunization due        Relevant Orders    Pneumococcal conjugate vaccine, 20-valent (PREVNAR 20) (Completed)      Coronary artery disease involving native coronary artery of native heart without angina pectoris        Relevant Orders    TSH with reflex to Free T4 if abnormal    Vitamin B12    Vitamin D 25-Hydroxy,Total (for eval of Vitamin D levels)    CBC and Auto Differential    Comprehensive Metabolic Panel    Lipid Panel    Prostate Specific Antigen, Screen    Pneumococcal conjugate vaccine, 20-valent (PREVNAR 20) (Completed)    Follow Up In [facility] - PCP - Established    Follow Up In [facility] - PCP - Health Maintenance      Prostate cancer screening        Relevant Orders    Prostate Specific Antigen, Screen            Assessment & Plan  Type 1 Diabetes Mellitus  Good control with insulin pump and CGM. No hypoglycemic seizures since CGM use.  - Continue current insulin pump and CGM management.    Hyperlipidemia  LDL goal below 70 mg/dL due to diabetes and below 55 mg/dL due to coronary artery  plaque. Zetia suggested as alternative to statins.  - Start Zetia 10 mg daily.  - Recheck lipids in 3 months.  - Consider consultation with a pharmacist for medication optimization if needed.    Essential Hypertension  Emphasized importance of controlling blood pressure to prevent vascular complications.  - Restart lisinopril 40 mg daily.  - Restart hydrochlorothiazide 12.5 mg daily.    Fatty Liver Disease  Common in diabetes, can lead to cirrhosis if not managed.  - Discuss management strategies for fatty liver disease in the next visit.    Retinopathy  Follow-up with retina specialist is ongoing.  - Obtain records [facility].  - Continue follow-up with retina specialist.    History of Skin Cancer  Regular follow-ups with dermatology clinic.  - Continue regular dermatology follow-ups.    General Health Maintenance  Up to date with most vaccinations. Due for pneumococcal vaccine. Family history of colon cancer.  - Administer Prevnar 20 pneumococcal vaccine today.  - Order PSA screening.  - Continue regular colonoscopy screenings every 5 years.    Follow-up  Monitor conditions and adjust treatment as necessary.  - Schedule follow-up appointment in 6 months after endocrinology visit.  - Order labs in 3 months to assess lipid levels post-Zetia initiation.    PREVENT  Level 4    Follow-Up Recommendations: 6mo     Please excuse any typos or grammatical errors, part of this note was constructed with Dragon dictation software.    This medical note was created with the assistance of artificial intelligence (AI) for documentation purposes. The content has been reviewed and confirmed by the healthcare provider for accuracy and completeness. Patient consented to the use of audio recording and use of AI during their visit.         Le Avila DO, MSEd  [facility]   Office: [phone]  6/23/2025 7:14 PM         [1]   Social History  Socioeconomic History    Marital status:     Tobacco Use    Smoking status: Never     Passive exposure: Past    Smokeless tobacco: Never   Substance and Sexual Activity    Alcohol use: Not Currently    Drug use: Never    Sexual activity: Yes     Partners: Female     Social Drivers of Health      Received from [facility]    Intimate Partner Violence   [2]   Past Medical History:  Diagnosis Date    Diabetes mellitus (Multi) 1990    HL (hearing loss) ??    Hypertension    [3]   Patient Active Problem List  Diagnosis    Abnormal EKG    Chest pain, atypical    DM type 1 with diabetic erectile dysfunction (Multi)    Edema of both lower extremities    Elevated serum creatinine    Erectile dysfunction    Hyperlipidemia    Hypertriglyceridemia    Hypertension    Hypoglycemia    Mild vitamin D deficiency    Pain, hand    Rectus diastasis    Seizure (Multi)    Umbilical hernia    Attention deficit hyperactivity disorder, predominantly inattentive type    History of colon polyps    Insulin pump status    Nonproliferative diabetic retinopathy of both eyes without macular edema    Obesity, Class II, BMI 35-39.9    Type 1 diabetes mellitus   [4]   Allergies  Allergen Reactions    Amlodipine Swelling    Metoprolol Other     FATIGUE    Simvastatin Myalgia    Sulfamethoxazole-Trimethoprim Rash   [5]   Past Surgical History:  Procedure Laterality Date    APPENDECTOMY  09/19/2014    Appendectomy    HERNIA REPAIR  4/14/23    SHOULDER SURGERY  09/19/2014    Shoulder Surgery    WISDOM TOOTH EXTRACTION  1980   [6]   Family History  Problem Relation Name Age of Onset    Asthma Father Josh Dhillon     Colon cancer Father Josh Dhillon

## 2025-06-09 ENCOUNTER — PATIENT MESSAGE (OUTPATIENT)
Dept: PRIMARY CARE | Facility: CLINIC | Age: 60
End: 2025-06-09
Payer: COMMERCIAL

## 2025-06-13 NOTE — PATIENT COMMUNICATION
Patient reports swelling in feet with Zetia.     Le Avila DO, MSEd  [facility]  Office: [phone]  6/12/2025 9:05 PM

## 2025-08-03 DIAGNOSIS — E10.69 TYPE 1 DIABETES MELLITUS WITH OTHER SPECIFIED COMPLICATION: ICD-10-CM

## 2025-08-03 DIAGNOSIS — N52.1 ERECTILE DYSFUNCTION DUE TO DISEASES CLASSIFIED ELSEWHERE: ICD-10-CM

## 2025-08-04 RX ORDER — TADALAFIL 20 MG/1
20 TABLET ORAL DAILY PRN
Qty: 10 TABLET | Refills: 3 | Status: SHIPPED | OUTPATIENT
Start: 2025-08-04

## 2025-08-05 NOTE — TELEPHONE ENCOUNTER
BRIEF NOTE    Subjective/Objective:  Pharmacy refill request.     Assessment/Plan:  1. Type 1 diabetes mellitus with other specified complication  - tadalafil 20 mg tablet; Take 1 tablet (20 mg) by mouth once daily as needed for erectile dysfunction.  Dispense: 10 tablet; Refill: 3    2. Erectile dysfunction due to diseases classified elsewhere  - tadalafil 20 mg tablet; Take 1 tablet (20 mg) by mouth once daily as needed for erectile dysfunction.  Dispense: 10 tablet; Refill: 3    No problem-specific Assessment & Plan notes found for this encounter.        Le Avila DO, MSEd  [facility]  Office: [phone]  8/4/2025 10:57 PM

## 2025-08-06 ENCOUNTER — OFFICE VISIT (OUTPATIENT)
Dept: PRIMARY CARE | Facility: CLINIC | Age: 60
End: 2025-08-06
Payer: COMMERCIAL

## 2025-08-06 VITALS
TEMPERATURE: 98.4 F | BODY MASS INDEX: 37.15 KG/M2 | SYSTOLIC BLOOD PRESSURE: 135 MMHG | DIASTOLIC BLOOD PRESSURE: 78 MMHG | HEIGHT: 71 IN | OXYGEN SATURATION: 94 % | WEIGHT: 265.4 LBS | HEART RATE: 64 BPM

## 2025-08-06 DIAGNOSIS — N52.1: ICD-10-CM

## 2025-08-06 DIAGNOSIS — R93.89 ABNORMAL CT OF THE CHEST: ICD-10-CM

## 2025-08-06 DIAGNOSIS — E10.9 TYPE 1 DIABETES MELLITUS WITHOUT COMPLICATION: ICD-10-CM

## 2025-08-06 DIAGNOSIS — I25.10 CORONARY ARTERY DISEASE INVOLVING NATIVE CORONARY ARTERY OF NATIVE HEART WITHOUT ANGINA PECTORIS: ICD-10-CM

## 2025-08-06 DIAGNOSIS — R93.1 AGATSTON CAC SCORE, <100: ICD-10-CM

## 2025-08-06 DIAGNOSIS — R05.3 CHRONIC COUGH: Primary | ICD-10-CM

## 2025-08-06 DIAGNOSIS — Z96.41 INSULIN PUMP STATUS: ICD-10-CM

## 2025-08-06 DIAGNOSIS — E66.812 OBESITY, CLASS II, BMI 35-39.9: ICD-10-CM

## 2025-08-06 DIAGNOSIS — R91.8 PULMONARY NODULES: ICD-10-CM

## 2025-08-06 DIAGNOSIS — E78.2 MIXED HYPERLIPIDEMIA: ICD-10-CM

## 2025-08-06 DIAGNOSIS — E78.1 HYPERTRIGLYCERIDEMIA: ICD-10-CM

## 2025-08-06 DIAGNOSIS — I10 PRIMARY HYPERTENSION: ICD-10-CM

## 2025-08-06 DIAGNOSIS — E10.69: ICD-10-CM

## 2025-08-06 PROCEDURE — 3008F BODY MASS INDEX DOCD: CPT | Performed by: STUDENT IN AN ORGANIZED HEALTH CARE EDUCATION/TRAINING PROGRAM

## 2025-08-06 PROCEDURE — 99215 OFFICE O/P EST HI 40 MIN: CPT | Performed by: STUDENT IN AN ORGANIZED HEALTH CARE EDUCATION/TRAINING PROGRAM

## 2025-08-06 PROCEDURE — 3075F SYST BP GE 130 - 139MM HG: CPT | Performed by: STUDENT IN AN ORGANIZED HEALTH CARE EDUCATION/TRAINING PROGRAM

## 2025-08-06 PROCEDURE — 3078F DIAST BP <80 MM HG: CPT | Performed by: STUDENT IN AN ORGANIZED HEALTH CARE EDUCATION/TRAINING PROGRAM

## 2025-08-06 PROCEDURE — 4010F ACE/ARB THERAPY RXD/TAKEN: CPT | Performed by: STUDENT IN AN ORGANIZED HEALTH CARE EDUCATION/TRAINING PROGRAM

## 2025-08-06 RX ORDER — LOSARTAN POTASSIUM 100 MG/1
100 TABLET ORAL DAILY
Qty: 90 TABLET | Refills: 3 | Status: SHIPPED | OUTPATIENT
Start: 2025-08-06

## 2025-08-06 NOTE — PROGRESS NOTES
FAMILY MEDICINE  OFFICE VISIT   Thompson Dhillon  [id number]  1965    PCP: Le Avila DO     Chief Complaint:   Chief Complaint   Patient presents with    Cough     Pt presents for a cough that has been going on for several weeks now- states that that this is intermittent, non-productive, dry cough     SUBJECTIVE     Thompson Dhillon is a 60 y.o. ***-speaking male, who presents to the clinic with complaints of ***.    History of Present Illness  Thompson Dhillon is a 60 year old male with hypertension and type 1 diabetes who presents with a persistent cough.    He has been experiencing a persistent cough for the past few weeks. The cough is dry and sometimes productive, occurring intermittently without a clear pattern or trigger. No association with food intake or swallowing is noted. He recalls previous findings on his lungs, including small scars and nodules noted on a previous CT cardiac score, and expresses concern about potential changes. 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    He is currently taking lisinopril and suspects it might be contributing to his cough. He has a history of using amlodipine, which caused side effects that required discontinuation, and is now on a diuretic due to previous issues with amlodipine. He mentions excessive sweating and questions whether it could be related to his current medications, specifically hydrochlorothiazide. He is concerned about the long-term use of multiple medications and wants to reduce his medication burden.    He has type 1 diabetes and is concerned about his cholesterol management. He has previously tried statins and Zetia but experienced issues with these medications. He is currently taking a baby aspirin.      HPI      The following portions of the patient's chart were reviewed in this encounter and updated as appropriate:      "    Home Medication List:  Current Outpatient Medications   Medication Instructions    albuterol 90 mcg/actuation inhaler Inhale. INHALE 1 TO 2 PUFFS BY MOUTH EVERY 4 TO 6 HOURS AS NEEDED    aspirin 81 mg EC tablet Take by mouth.    blood sugar diagnostic (Contour Next Test Strips) strip Ivet Contour Next Test STRP   Quantity: 255  Refills: 0        Start : 28-Mar-2014   Active    cholecalciferol (Vitamin D-3) 5,000 Units tablet Take by mouth.    cyanocobalamin (vitamin B-12) (VITAMIN B-12) 5,000 mcg, Daily    Glucagon Emergency Kit, human, 1 mg injection INJECT (1)ONE MG FOR INSULIN SHOCK.    hydroCHLOROthiazide (MICROZIDE) 12.5 mg, oral, Daily    insulin aspart (NovoLOG) 100 unit/mL injection Inject under the skin. 150 UNITS DAILY    lisinopril 40 mg, oral, Daily, for blood pressure    omega 3-dha-epa-fish oil (Fish OiL) 1,200 (144-216) mg capsule 1,200 mg, Daily    OneTouch Delica Plus Lancet 33 gauge misc USE TO TEST BLOOD SUGAR ONCE DAILY    OneTouch Verio Reflect Meter misc TO TEST BLOOD SUGAR.    tadalafil 20 mg, oral, Daily PRN         OBJECTIVE   /78 (BP Location: Right arm, Patient Position: Sitting, BP Cuff Size: Large adult)   Pulse 64   Temp 36.9 °C (98.4 °F) (Temporal)   Ht 1.803 m (5' 11\")   Wt 120 kg (265 lb 6.4 oz)   SpO2 94%   BMI 37.02 kg/m²   Vital signs and pulse oximetry reviewed.     Physical Exam    Physical Exam  HEENT: Right ear tube present, left ear normal.    Results  RADIOLOGY  CT cardiac score: Normal lung size, small scars in lower lobes, nodules present, scarring in the lingula of the left lung (05/2022)    ***    ASSESSMENT & PLAN     Problem List Items Addressed This Visit    None      Assessment & Plan  Chronic cough  Intermittent dry cough for a couple of weeks, not associated with cold, food, or specific time of day. Possible medication-induced cough from lisinopril.  - Discontinue lisinopril  - Prescribe losartan 100 mg daily as a replacement for lisinopril  - " Order CT scan of the chest to evaluate pulmonary nodules and scarring    Pulmonary nodules and scarring  Previous CT cardiac score showed normal lung size but scarring in the lower and middle lung regions and nodules. Possible causes include prior infections, lung disease, or chemical exposure at work. 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  - Order CT scan of the chest to evaluate pulmonary nodules and scarring    Hypertension  Currently managed with lisinopril, which is being discontinued due to cough. Losartan is chosen as an alternative due to similar efficacy without the cough side effect. Losartan may cause elevated potassium or a temporary increase in kidney function tests, which typically resolves after 3 weeks.  - Prescribe losartan 100 mg daily as a replacement for lisinopril  - Monitor kidney function and potassium levels 4-6 weeks after starting losartan    Type 1 diabetes mellitus  Ongoing management with insulin. Discussed the importance of maintaining LDL levels below 70 due to diabetes and coronary atherosclerosis.    Hyperlipidemia and coronary atherosclerosis  Previous CT cardiac score showed plaque in coronary vessels. LDL goal is less than 55 due to existing plaque. Previous intolerance to statins and Zetia. Discussed potential use of Vascepa or similar medications based on insurance coverage. Emphasized the importance of baby aspirin for cardiovascular protection.  - Consult with pharmacy team to explore alternative cholesterol medications  - Consider Vascepa or similar medications based on insurance coverage  - Continue baby aspirin for cardiovascular protection      ***    Follow-Up Recommendations: ***    Please excuse any typos or grammatical errors, part of this note was constructed with Dragon dictation software.    This medical note was created with the assistance of artificial intelligence (AI) for documentation purposes. The content  has been reviewed and confirmed by the healthcare provider for accuracy and completeness. Patient consented to the use of audio recording and use of AI during their visit.         Le Avila DO, MSEd  [facility]   Office: [phone]  8/6/2025 10:52 AM     Monitor kidney function and potassium levels 4-6 weeks after starting losartan    Type 1 diabetes mellitus  Ongoing management with insulin. Discussed the importance of maintaining LDL levels below 70 due to diabetes and coronary atherosclerosis.    Hyperlipidemia and coronary atherosclerosis  Previous CT cardiac score showed plaque in coronary vessels. LDL goal is less than 55 due to existing plaque. Previous intolerance to statins and Zetia. Discussed potential use of Vascepa or similar medications based on insurance coverage. Emphasized the importance of baby aspirin for cardiovascular protection.  - Consult with pharmacy team to explore alternative cholesterol medications  - Consider Vascepa or similar medications based on insurance coverage  - Continue baby aspirin for cardiovascular protection      Total time spent caring for the patient today was 47 minutes. This includes time spent before the visit reviewing the chart, time spent during the visit discussing symptoms and developing a plan of care for this patient, as well as documentation.     Level 5    Follow-Up Recommendations: 4mo    Please excuse any typos or grammatical errors, part of this note was constructed with Dragon dictation software.    This medical note was created with the assistance of artificial intelligence (AI) for documentation purposes. The content has been reviewed and confirmed by the healthcare provider for accuracy and completeness. Patient consented to the use of audio recording and use of AI during their visit.         Le Avila DO, Shahriar  [facility]   Office: [phone]  8/24/2025 10:32 PM

## 2025-08-06 NOTE — PATIENT INSTRUCTIONS
- Wait until after Labor Day to get your lab and you can do them the same day as CT Scan.   - Call Jaime Leiva's direct phone # [phone] to schedule your referrals, imaging, or tests. If she is unavailable, you can call [facility] at [phone].  - To report insurance companies: [place] Department of Insurance   [email or web address].[place].gov/about-us/[email or web address]-[email or web address]    VISIT SUMMARY:  Today, we discussed your persistent cough, lung health, hypertension, diabetes management, and cholesterol levels. We made some changes to your medications and planned further evaluations to address your concerns.    YOUR PLAN:  -CHRONIC COUGH: Your persistent cough may be caused by your current medication, lisinopril. We will discontinue lisinopril and start you on losartan 100 mg daily instead. Additionally, we will order a CT scan of your chest to check for any changes in your lungs.    -PULMONARY NODULES AND SCARRING: You have previous findings of scarring and nodules in your lungs, which could be due to past infections, lung disease, or chemical exposure at work. We will order a CT scan of your chest to evaluate these findings further.    -HYPERTENSION: Your high blood pressure is currently managed with lisinopril, which we are discontinuing due to your cough. We will replace it with losartan 100 mg daily. Losartan is effective for blood pressure control and should not cause a cough. We will monitor your kidney function and potassium levels 4-6 weeks after starting losartan.    -TYPE 1 DIABETES MELLITUS: You have type 1 diabetes, which requires careful management of your blood sugar levels with insulin. It is important to keep your LDL cholesterol levels below 70 to reduce the risk of heart disease.    -HYPERLIPIDEMIA AND CORONARY ATHEROSCLEROSIS: You have high cholesterol and plaque in your coronary arteries. Your LDL cholesterol goal is less than 55. Since you have had issues  with statins and Zetia, we will consult with the pharmacy team to find alternative medications like Vascepa, depending on your insurance coverage. Continue taking baby aspirin for heart protection.    INSTRUCTIONS:  Please schedule a CT scan of your chest as soon as possible. We will also need to check your kidney function and potassium levels 4-6 weeks after you start taking losartan. Continue taking your baby aspirin daily. We will consult with the pharmacy team to explore alternative cholesterol medications and will update you on the next steps.

## 2025-09-11 ENCOUNTER — APPOINTMENT (OUTPATIENT)
Dept: PHARMACY | Facility: HOSPITAL | Age: 60
End: 2025-09-11
Payer: COMMERCIAL

## 2025-12-11 ENCOUNTER — APPOINTMENT (OUTPATIENT)
Dept: PRIMARY CARE | Facility: CLINIC | Age: 60
End: 2025-12-11
Payer: COMMERCIAL

## 2026-06-05 ENCOUNTER — APPOINTMENT (OUTPATIENT)
Dept: PRIMARY CARE | Facility: CLINIC | Age: 61
End: 2026-06-05
Payer: COMMERCIAL